# Patient Record
Sex: MALE | Race: WHITE | NOT HISPANIC OR LATINO | Employment: FULL TIME | URBAN - METROPOLITAN AREA
[De-identification: names, ages, dates, MRNs, and addresses within clinical notes are randomized per-mention and may not be internally consistent; named-entity substitution may affect disease eponyms.]

---

## 2017-01-04 VITALS
SYSTOLIC BLOOD PRESSURE: 126 MMHG | RESPIRATION RATE: 20 BRPM | DIASTOLIC BLOOD PRESSURE: 89 MMHG | BODY MASS INDEX: 47.74 KG/M2 | TEMPERATURE: 97 F | WEIGHT: 315 LBS | HEART RATE: 71 BPM | HEIGHT: 68 IN

## 2017-01-04 RX ORDER — LEVOTHYROXINE SODIUM 0.2 MG/1
200 TABLET ORAL DAILY
COMMUNITY

## 2017-06-06 ENCOUNTER — HOSPITAL ENCOUNTER (OUTPATIENT)
Dept: GENERAL RADIOLOGY | Facility: HOSPITAL | Age: 52
Discharge: HOME | End: 2017-06-06
Admitting: PHYSICIAN ASSISTANT

## 2017-06-06 ENCOUNTER — APPOINTMENT (OUTPATIENT)
Dept: LAB | Facility: HOSPITAL | Age: 52
End: 2017-06-06

## 2017-06-06 VITALS
DIASTOLIC BLOOD PRESSURE: 77 MMHG | WEIGHT: 302.5 LBS | SYSTOLIC BLOOD PRESSURE: 135 MMHG | HEIGHT: 68 IN | BODY MASS INDEX: 45.85 KG/M2 | HEART RATE: 72 BPM | RESPIRATION RATE: 18 BRPM | TEMPERATURE: 97.3 F

## 2017-06-06 DIAGNOSIS — R06.00 DYSPNEA, UNSPECIFIED TYPE: ICD-10-CM

## 2017-06-06 DIAGNOSIS — R06.00 DYSPNEA, UNSPECIFIED TYPE: Primary | ICD-10-CM

## 2017-06-06 DIAGNOSIS — R53.83 FATIGUE, UNSPECIFIED TYPE: ICD-10-CM

## 2017-06-06 LAB
ALBUMIN SERPL-MCNC: 3.9 G/DL (ref 3.2–4.8)
ALBUMIN/GLOB SERPL: 1.3 G/DL (ref 1.5–2.5)
ALP SERPL-CCNC: 90 U/L (ref 25–100)
ALT SERPL W P-5'-P-CCNC: 35 U/L (ref 7–40)
ANION GAP SERPL CALCULATED.3IONS-SCNC: 2 MMOL/L (ref 3–11)
AST SERPL-CCNC: 27 U/L (ref 0–33)
BASOPHILS # BLD AUTO: 0.04 10*3/MM3 (ref 0–0.2)
BASOPHILS NFR BLD AUTO: 0.4 % (ref 0–1)
BILIRUB SERPL-MCNC: 1 MG/DL (ref 0.3–1.2)
BUN BLD-MCNC: 21 MG/DL (ref 9–23)
BUN/CREAT SERPL: 23.3 (ref 7–25)
CALCIUM SPEC-SCNC: 9.5 MG/DL (ref 8.7–10.4)
CHLORIDE SERPL-SCNC: 105 MMOL/L (ref 99–109)
CO2 SERPL-SCNC: 32 MMOL/L (ref 20–31)
CREAT BLD-MCNC: 0.9 MG/DL (ref 0.6–1.3)
DEPRECATED RDW RBC AUTO: 41.5 FL (ref 37–54)
EOSINOPHIL # BLD AUTO: 0.15 10*3/MM3 (ref 0.1–0.3)
EOSINOPHIL NFR BLD AUTO: 1.5 % (ref 0–3)
ERYTHROCYTE [DISTWIDTH] IN BLOOD BY AUTOMATED COUNT: 12.8 % (ref 11.3–14.5)
GFR SERPL CREATININE-BSD FRML MDRD: 89 ML/MIN/1.73
GLOBULIN UR ELPH-MCNC: 3 GM/DL
GLUCOSE BLD-MCNC: 94 MG/DL (ref 70–100)
HCT VFR BLD AUTO: 48.6 % (ref 38.9–50.9)
HGB BLD-MCNC: 15.5 G/DL (ref 13.1–17.5)
IMM GRANULOCYTES # BLD: 0.03 10*3/MM3 (ref 0–0.03)
IMM GRANULOCYTES NFR BLD: 0.3 % (ref 0–0.6)
LYMPHOCYTES # BLD AUTO: 2.32 10*3/MM3 (ref 0.6–4.8)
LYMPHOCYTES NFR BLD AUTO: 23.3 % (ref 24–44)
MCH RBC QN AUTO: 28.1 PG (ref 27–31)
MCHC RBC AUTO-ENTMCNC: 31.9 G/DL (ref 32–36)
MCV RBC AUTO: 88.2 FL (ref 80–99)
MONOCYTES # BLD AUTO: 0.94 10*3/MM3 (ref 0–1)
MONOCYTES NFR BLD AUTO: 9.4 % (ref 0–12)
NEUTROPHILS # BLD AUTO: 6.48 10*3/MM3 (ref 1.5–8.3)
NEUTROPHILS NFR BLD AUTO: 65.1 % (ref 41–71)
PLATELET # BLD AUTO: 253 10*3/MM3 (ref 150–450)
PMV BLD AUTO: 10.8 FL (ref 6–12)
POTASSIUM BLD-SCNC: 4.5 MMOL/L (ref 3.5–5.5)
PROT SERPL-MCNC: 6.9 G/DL (ref 5.7–8.2)
RBC # BLD AUTO: 5.51 10*6/MM3 (ref 4.2–5.76)
SODIUM BLD-SCNC: 139 MMOL/L (ref 132–146)
WBC NRBC COR # BLD: 9.96 10*3/MM3 (ref 3.5–10.8)

## 2017-06-06 PROCEDURE — 71020 HC CHEST PA AND LATERAL: CPT

## 2017-06-06 PROCEDURE — 80053 COMPREHEN METABOLIC PANEL: CPT | Performed by: PHYSICIAN ASSISTANT

## 2017-06-06 PROCEDURE — 85025 COMPLETE CBC W/AUTO DIFF WBC: CPT | Performed by: PHYSICIAN ASSISTANT

## 2017-06-06 PROCEDURE — 36415 COLL VENOUS BLD VENIPUNCTURE: CPT

## 2017-06-12 ENCOUNTER — DOCUMENTATION (OUTPATIENT)
Dept: BARIATRICS/WEIGHT MGMT | Facility: CLINIC | Age: 52
End: 2017-06-12

## 2017-06-12 NOTE — PROGRESS NOTES
[facility] BARIATRIC SURGERY  [address]  [facility] [address] [phone]      Patient  Name:  Hao Kim.  :  1965        Chief Complaint:  weight gain; unable to maintain weight loss    History of Present Illness:  Hao Kim is a 52 y.o. male who presents today for evaluation, education and consultation regarding weight loss surgery. The patient is interested in SIPS.    Initial Intake Eval 3/2016 w/ Aster PAC --  49 y/o gentleman s/p lap LSG (34 Libyan) w/ Dr. Jurado in [place] 2011.  Uncomplicated.  Presurgery weight 410 lbs w/ BMI 62.  Initially did well.  Lost 140 lbs in the first year.  Maintained for several years, but has slowly regained in the past couple years.  Feels he has portion control but has really been unable to lose anymore.  New job is very sedentary, but walks 30 min/day.  Current weight 302 lbs w/ BMI 46.  Seeking Revision.    MD Consult 2016 w/ Dr. Dorantes -- 50 yo MO .5/48.3.  I just did his wife's 2nd stage loop DS a few weeks ago - he is well aware she was my first and only loop DS so far.  Says wife has 4-5 pasty BM's/day, losing about 2 lbs a week.  Says her stool smells foul.  Pt still very interested in proceeding with 2nd stage loop DS (SIPS).  After he left the office, we were informed that [facility] now requires it be done under an IRB.  Aware needs to take fat soluble vitamins for the rest of his life o/w risks running severe and irreversible issues such as blindness.  _______________    Past Medical History:   Diagnosis Date   • Dyspnea on exertion    • Elevated bilirubin     Chronic/ Denies prior GI eval.   • Elevated LFTs    • Fatigue    • Heartburn     UGI neg HH. EGD GDW  mild prox dil, mod narrowing angularis w sl twist, ? sm HH, 38 cm   • Hypothyroidism    • Morbid obesity    • Sleep apnea     CPAP compliant     Past Surgical History:   Procedure Laterality Date   • GASTRIC SLEEVE LAPAROSCOPIC  2011     s/p lap LSG (34 Yoruba) w/ Dr Jurado in [place]        No Known Allergies      Current Outpatient Prescriptions:   •  azithromycin (ZITHROMAX) 250 MG tablet, Take 2 tablets the first day, then 1 tablet daily for 4 days., Disp: 6 tablet, Rfl: 0  •  levothyroxine (SYNTHROID, LEVOTHROID) 200 MCG tablet, Take 200 mcg by mouth Daily., Disp: , Rfl:   •  Multiple Vitamins-Minerals (MULTIVITAMIN ADULT PO), Take  by mouth., Disp: , Rfl:   •  oxymetazoline (AFRIN) 0.05 % nasal spray, 2 sprays into each nostril 2 (Two) Times a Day., Disp: 10 mL, Rfl: 0    Social History     Social History   • Marital status:      Spouse name: N/A   • Number of children: N/A   • Years of education: N/A     Occupational History   • Not on file.     Social History Main Topics   • Smoking status: Never Smoker   • Smokeless tobacco: Never Used   • Alcohol use Yes      Comment: 1-2x per week   • Drug use: No   • Sexual activity: Not on file      Comment:      Other Topics Concern   • Not on file     Social History Narrative     Family History   Problem Relation Age of Onset   • Cancer Father    • Diabetes Father    • Stroke Father    • Hypertension Maternal Grandmother    • Heart attack Maternal Grandmother    • Obesity Maternal Grandmother          Review of Systems:    Systemic: Feeling tired (fatigue).  No fever and no chills.  Recent weight gain.  No night sweats.  Eyes: Wears glasses.  Otolaryngeal: Chronic allergies.  Cardiovascular: No hypertension, no angina, no ischemic heart disease, and no deep venous thrombosis.  Pulmonary: No asthma.  Uses CPAP / BiPAP Suffers from sleep apnea.  No pulmonary embolism.  Dyspnea during exertion.  Gastrointestinal: Reports heartburn.  No liver disease, no irritable bowel, no pancreatic disease, and no gallbladder problems.  Genitourinary: No kidney stones, no kidney failure, and no renal insuff.  Endocrine: No gout, no diabetes, and no dyslipidemia.  Hypothyroidism.  No abnormal glucose  levels.  Hematologic: No bleeding disorder and no prior blood transfusions.  Musculoskeletal: No fibromyalgia, no lupus, no autoimmune disease, and no osteoarthritis.  Hip joint pain.  Neurological: Dizziness.  No seizures and no strokes.  Psychological: No anxiety and no depression.  No bipolar disorder.  Skin: Pt reports rash under skin folds.  No history of MRSA skin infections.   All other systems reviewed/negative.      Physical Exam:  Vital Signs:      There is no height or weight on file to calculate BMI.                Physical Exam   Constitutional: He is oriented to person, place, and time. He appears well-developed and well-nourished.   HENT:   Head: Normocephalic and atraumatic.   Eyes: Conjunctivae are normal. No scleral icterus.   Neck: Neck supple. No thyromegaly present.   Cardiovascular: Normal rate and regular rhythm.    No murmur heard.  Pulmonary/Chest: Effort normal and breath sounds normal. No respiratory distress. He has no wheezes. He has no rales.   Abdominal: Soft. Bowel sounds are normal. He exhibits no distension and no mass. There is no tenderness. No hernia.   Scars:  Lap LSG   Musculoskeletal: Normal range of motion. He exhibits no edema.   Neurological: He is alert and oriented to person, place, and time. Gait normal.   Skin: Skin is warm and dry. No rash noted.   Psychiatric: He has a normal mood and affect. Judgment normal.   Vitals reviewed.         Patient Active Problem List   Diagnosis   • Sleep apnea   • Heartburn   • Fatigue   • Hypothyroidism   • Dyspnea on exertion   • Elevated LFTs   • Elevated bilirubin   • Morbid obesity       Assessment:    Hao Kim is a 52 y.o. year old male with medically complicated obesity pursuing SIPS.    Weight loss surgery is deemed medically necessary given the following obesity related comorbidities including sleep apnea with current   and There is no height or weight on file to calculate BMI.    Plan:  The consultation plan and program  requirements were reviewed with the patient.  The patient has been advised that a letter of medical support must be obtained from his primary care physician or referring provider. A psychological evaluation will be arranged.  A nutritional evaluation will be performed.  The patient was advised to start a high protein and low carbohydrate diet.  Necessary lifestyle modifications were discussed.  Instructions on how to access OTTO was given to the patient.  OTTO is an internet based educational video that explains the surgical procedure chosen and answers basic questions regarding that procedure.     Preoperative testing will include: CBC, CMP, Fasting Lipids, TSH, H.Pylori, CXR, EKG and EGD     Additional preop clearances required prior to surgery: None.      Patient understands that bariatric surgery is not cosmetic surgery but rather a tool to help make a lifelong commitment to lifestyle changes including diet, exercise, behavior modifications, and healthy habits.      SEBASTIÁN Edwards

## 2017-06-13 ENCOUNTER — CONSULT (OUTPATIENT)
Dept: BARIATRICS/WEIGHT MGMT | Facility: CLINIC | Age: 52
End: 2017-06-13

## 2017-06-13 VITALS
RESPIRATION RATE: 18 BRPM | BODY MASS INDEX: 47.74 KG/M2 | WEIGHT: 315 LBS | OXYGEN SATURATION: 99 % | HEART RATE: 88 BPM | DIASTOLIC BLOOD PRESSURE: 78 MMHG | SYSTOLIC BLOOD PRESSURE: 110 MMHG | HEIGHT: 68 IN | TEMPERATURE: 98.1 F

## 2017-06-13 DIAGNOSIS — E66.01 OBESITY, CLASS III, BMI 40-49.9 (MORBID OBESITY) (HCC): Primary | ICD-10-CM

## 2017-06-13 PROCEDURE — 99407 BEHAV CHNG SMOKING > 10 MIN: CPT | Performed by: SURGERY

## 2017-06-13 PROCEDURE — 99213 OFFICE O/P EST LOW 20 MIN: CPT | Performed by: SURGERY

## 2017-06-13 RX ORDER — SODIUM CHLORIDE, SODIUM LACTATE, POTASSIUM CHLORIDE, CALCIUM CHLORIDE 600; 310; 30; 20 MG/100ML; MG/100ML; MG/100ML; MG/100ML
150 INJECTION, SOLUTION INTRAVENOUS CONTINUOUS
Status: CANCELLED | OUTPATIENT
Start: 2017-06-13

## 2017-06-13 RX ORDER — SODIUM CHLORIDE 0.9 % (FLUSH) 0.9 %
1-10 SYRINGE (ML) INJECTION AS NEEDED
Status: CANCELLED | OUTPATIENT
Start: 2017-06-13

## 2017-06-13 RX ORDER — CHLORHEXIDINE GLUCONATE 0.12 MG/ML
15 RINSE ORAL ONCE
Status: CANCELLED | OUTPATIENT
Start: 2017-06-13

## 2017-06-13 RX ORDER — SCOLOPAMINE TRANSDERMAL SYSTEM 1 MG/1
1 PATCH, EXTENDED RELEASE TRANSDERMAL ONCE
Status: CANCELLED | OUTPATIENT
Start: 2017-06-13 | End: 2017-06-13

## 2017-06-13 RX ORDER — PANTOPRAZOLE SODIUM 40 MG/10ML
40 INJECTION, POWDER, LYOPHILIZED, FOR SOLUTION INTRAVENOUS ONCE
Status: CANCELLED | OUTPATIENT
Start: 2017-06-13 | End: 2017-06-13

## 2017-06-13 RX ORDER — ACETAMINOPHEN 10 MG/ML
1000 INJECTION, SOLUTION INTRAVENOUS ONCE
Status: CANCELLED | OUTPATIENT
Start: 2017-06-13 | End: 2017-06-13

## 2017-06-13 NOTE — PROGRESS NOTES
[facility] BARIATRIC SURGERY  [address]  [facility] [address] [phone]      Patient  Name:  Hao Kim.  :  1965        Chief Complaint:  weight gain; unable to maintain weight loss.  Preop SIPS (loop DS, SAD-I)    History of Present Illness:  Hao Kim is a 52 y.o. male who presents today for evaluation, education and consultation regarding weight loss surgery. The patient is interested in SIPS.    Initial Intake Eval 3/2016 w/ Aster PAC --  51 y/o gentleman s/p lap LSG (34 Latvian) w/ Dr. Jurado in [place] 2011.  Uncomplicated.  Presurgery weight 410 lbs w/ BMI 62.  Initially did well.  Lost 140 lbs in the first year.  Maintained for several years, but has slowly regained in the past couple years.  Feels he has portion control but has really been unable to lose anymore.  New job is very sedentary, but walks 30 min/day.  Current weight 302 lbs w/ BMI 46.  Seeking Revision.    MD Consult 2016 w/ Dr. Dorantes -- 52 yo MO .5/48.3.  I just did his wife's 2nd stage loop DS a few weeks ago - he is well aware she was my first and only loop DS so far.  Says wife has 4-5 pasty BM's/day, losing about 2 lbs a week.  Says her stool smells foul.  Pt still very interested in proceeding with 2nd stage loop DS (SIPS).  After he left the office, we were informed that [facility] requires it be done under an IR[facility].  Aware needs to take fat soluble vitamins for the rest of his life o/w risks running severe and irreversible issues such as blindness.  _______________  Returns for final visit prior to investigational SIPS.  Pt aware being done under IRB, went over consent again in detail, wishes to proceed.  Says his wife is doing well, has lost about 40 lbs since November, has several foul stools per day - the patient is aware and is not deterred, very much wishes to proceed.    Past Medical History:   Diagnosis Date   • Dyspnea on exertion    • Elevated bilirubin      Chronic/ Denies prior GI eval.   • Elevated LFTs    • Fatigue    • Heartburn     UGI neg HH. EGD GDW 4/16 mild prox dil, mod narrowing angularis w sl twist, ? sm HH, 38 cm.  Says sx's aren't kalyn, controlled w Zantac. No dysphagia   • HLD (hyperlipidemia)     Recently dx, mildly elevated, says provider rec statin, he wants to wait   • Hypothyroidism     recently incr dose synthroid to 200 ug   • Morbid obesity    • Sleep apnea     CPAP compliant     Past Surgical History:   Procedure Laterality Date   • GASTRIC SLEEVE LAPAROSCOPIC  01/2011    s/p lap LSG (34 Danish) w/ Dr Jurado in [place]        No Known Allergies      Current Outpatient Prescriptions:   •  levothyroxine (SYNTHROID, LEVOTHROID) 200 MCG tablet, Take 200 mcg by mouth Daily., Disp: , Rfl:   •  Multiple Vitamins-Minerals (MULTIVITAMIN ADULT PO), Take  by mouth., Disp: , Rfl:   •  oxymetazoline (AFRIN) 0.05 % nasal spray, 2 sprays into each nostril 2 (Two) Times a Day., Disp: 10 mL, Rfl: 0  •  azithromycin (ZITHROMAX) 250 MG tablet, Take 2 tablets the first day, then 1 tablet daily for 4 days., Disp: 6 tablet, Rfl: 0    Social History     Social History   • Marital status:      Spouse name: N/A   • Number of children: N/A   • Years of education: N/A     Occupational History   • Not on file.     Social History Main Topics   • Smoking status: Never Smoker   • Smokeless tobacco: Never Used   • Alcohol use Yes      Comment: 1-2x per week   • Drug use: No   • Sexual activity: Not on file      Comment:      Other Topics Concern   • Not on file     Social History Narrative     Family History   Problem Relation Age of Onset   • Cancer Father    • Diabetes Father    • Stroke Father    • Hypertension Maternal Grandmother    • Heart attack Maternal Grandmother    • Obesity Maternal Grandmother          Review of Systems:    Systemic: Feeling tired (fatigue).  No fever and no chills.  Recent weight gain.  No night sweats.  Eyes: Wears  glasses.  Otolaryngeal: Chronic allergies.  Cardiovascular: No hypertension, no angina, no ischemic heart disease, and no deep venous thrombosis.  Pulmonary: No asthma.  Uses CPAP / BiPAP Suffers from sleep apnea.  No pulmonary embolism.  Dyspnea during exertion.  Gastrointestinal: Reports heartburn.  No liver disease, no irritable bowel, no pancreatic disease, and no gallbladder problems.  Genitourinary: No kidney stones, no kidney failure, and no renal insuff.  Endocrine: No gout, no diabetes, and no dyslipidemia.  Hypothyroidism.  No abnormal glucose levels.  Hematologic: No bleeding disorder and no prior blood transfusions.  Musculoskeletal: No fibromyalgia, no lupus, no autoimmune disease, and no osteoarthritis.  Hip joint pain.  Neurological: Dizziness.  No seizures and no strokes.  Psychological: No anxiety and no depression.  No bipolar disorder.  Skin: Pt reports rash under skin folds.  No history of MRSA skin infections.   All other systems reviewed/negative.      Physical Exam:  Vital Signs:  Weight: (!) 321 lb 8 oz (146 kg)   Body mass index is 48.88 kg/(m^2).  Temp: 98.1 °F (36.7 °C)   Heart Rate: 88   BP: 110/78     Physical Exam   Constitutional: He is oriented to person, place, and time. He appears well-developed and well-nourished.   HENT:   Head: Normocephalic and atraumatic.   Eyes: Conjunctivae are normal. No scleral icterus.   Neck: Neck supple. Carotid bruit is not present. No thyromegaly present.   Cardiovascular: Normal rate and regular rhythm.    No murmur heard.  Pulmonary/Chest: Effort normal and breath sounds normal. No respiratory distress. He has no wheezes. He has no rales.   Abdominal: Soft. Bowel sounds are normal. He exhibits no distension and no mass. There is no hepatosplenomegaly. There is no tenderness. No hernia.   Scars:  Lap LSG   Musculoskeletal: Normal range of motion. He exhibits no edema.   Neurological: He is alert and oriented to person, place, and time. Gait normal.  "  Skin: Skin is warm and dry. No rash noted.   Psychiatric: He has a normal mood and affect. Judgment normal.   Vitals reviewed.         Patient Active Problem List   Diagnosis   • Sleep apnea   • Heartburn   • Fatigue   • Hypothyroidism   • Dyspnea on exertion   • Elevated LFTs   • Elevated bilirubin   • Morbid obesity     Psych 3/16 Swati Hagen PhD  Sha neg  Assessment:    Hao Kim is a 52 y.o. year old male with medically complicated obesity pursuing SIPS.    Weight loss surgery is deemed medically necessary given the following obesity related comorbidities including sleep apnea with current Weight: (!) 321 lb 8 oz (146 kg) and Body mass index is 48.88 kg/(m^2).       Patient is aware that surgery is a tool, and that weight loss is not guaranteed but only seen in the context of appropriate use, follow up and exercise.    Complications  of laparoscopic SIPS were discussed. The patient is well aware of the potential complications of surgery that include but not limited to bleeding, infections, deep venous thrombosis, pulmonary embolism, pulmonary complications such as pneumonia, cardiac events, hernias, small bowel obstruction, damage to the spleen or other organs, bowel injury, disfiguring scars, failure to lose weight, need for additional surgery, conversion to an open procedure, and death. Patient is also aware of complications which apply in this particular procedure that can include but are not limited to a \"leak\" at the anastomosis.    Greater than 10 minutes was spent with the patient discussing avoiding all tobacco products and second hand smoke at least 2 weeks pre-operatively and 6 weeks post-operatively to minimize the risk of sleeve leak     Discussed the risks, benefits and alternative therapies at great length as outlined in our extensive consent forms, consent videos, and educational teaching process under the direction of the center's .    A copy of the patient's signed " informed consent is on file.      Plan:  SIPS (loop DS) as per study protocol.  Given sx's and poss HH on EGD, pt agreeable to poss lap HHR at time OR if HH present.    Patient understands that bariatric surgery is not cosmetic surgery but rather a tool to help make a lifelong commitment to lifestyle changes including diet, exercise, behavior modifications, and healthy habits.      Rob Dorantes MD

## 2017-06-18 ENCOUNTER — APPOINTMENT (OUTPATIENT)
Dept: PREADMISSION TESTING | Facility: HOSPITAL | Age: 52
End: 2017-06-18

## 2017-06-18 LAB
DEPRECATED RDW RBC AUTO: 40.4 FL (ref 37–54)
ERYTHROCYTE [DISTWIDTH] IN BLOOD BY AUTOMATED COUNT: 12.7 % (ref 11.3–14.5)
HBA1C MFR BLD: 5.6 % (ref 4.8–5.6)
HCT VFR BLD AUTO: 48.6 % (ref 38.9–50.9)
HGB BLD-MCNC: 16.2 G/DL (ref 13.1–17.5)
MCH RBC QN AUTO: 28.9 PG (ref 27–31)
MCHC RBC AUTO-ENTMCNC: 33.3 G/DL (ref 32–36)
MCV RBC AUTO: 86.6 FL (ref 80–99)
PLATELET # BLD AUTO: 211 10*3/MM3 (ref 150–450)
PMV BLD AUTO: 10 FL (ref 6–12)
RBC # BLD AUTO: 5.61 10*6/MM3 (ref 4.2–5.76)
WBC NRBC COR # BLD: 7.13 10*3/MM3 (ref 3.5–10.8)

## 2017-06-18 PROCEDURE — 93010 ELECTROCARDIOGRAM REPORT: CPT | Performed by: INTERNAL MEDICINE

## 2017-06-18 PROCEDURE — 36415 COLL VENOUS BLD VENIPUNCTURE: CPT

## 2017-06-18 PROCEDURE — 83036 HEMOGLOBIN GLYCOSYLATED A1C: CPT | Performed by: ANESTHESIOLOGY

## 2017-06-18 PROCEDURE — 93005 ELECTROCARDIOGRAM TRACING: CPT

## 2017-06-18 PROCEDURE — 85027 COMPLETE CBC AUTOMATED: CPT | Performed by: ANESTHESIOLOGY

## 2017-06-27 ENCOUNTER — ANESTHESIA EVENT (OUTPATIENT)
Dept: PERIOP | Facility: HOSPITAL | Age: 52
End: 2017-06-27

## 2017-06-28 ENCOUNTER — ANESTHESIA (OUTPATIENT)
Dept: PERIOP | Facility: HOSPITAL | Age: 52
End: 2017-06-28

## 2017-06-28 ENCOUNTER — HOSPITAL ENCOUNTER (INPATIENT)
Facility: HOSPITAL | Age: 52
LOS: 1 days | Discharge: HOME | End: 2017-06-29
Attending: SURGERY | Admitting: SURGERY

## 2017-06-28 DIAGNOSIS — E66.01 OBESITY, CLASS III, BMI 40-49.9 (MORBID OBESITY) (HCC): ICD-10-CM

## 2017-06-28 PROCEDURE — 25010000002 PROPOFOL 1000 MG/ML EMULSION: Performed by: NURSE ANESTHETIST, CERTIFIED REGISTERED

## 2017-06-28 PROCEDURE — 25010000002 BUPRENORPHINE PER 0.1 MG: Performed by: NURSE ANESTHETIST, CERTIFIED REGISTERED

## 2017-06-28 PROCEDURE — 25010000002 MIDAZOLAM PER 1 MG: Performed by: NURSE ANESTHETIST, CERTIFIED REGISTERED

## 2017-06-28 PROCEDURE — 25010000002 DEXAMETHASONE SODIUM PHOSPHATE 10 MG/ML SOLUTION 1 ML VIAL: Performed by: NURSE ANESTHETIST, CERTIFIED REGISTERED

## 2017-06-28 PROCEDURE — 94799 UNLISTED PULMONARY SVC/PX: CPT

## 2017-06-28 PROCEDURE — 25010000002 DEXAMETHASONE PER 1 MG: Performed by: NURSE ANESTHETIST, CERTIFIED REGISTERED

## 2017-06-28 PROCEDURE — 25010000002 ENOXAPARIN PER 10 MG: Performed by: SURGERY

## 2017-06-28 PROCEDURE — 25010000003 CEFAZOLIN IN DEXTROSE 2-4 GM/100ML-% SOLUTION: Performed by: SURGERY

## 2017-06-28 PROCEDURE — 25010000002 FENTANYL CITRATE (PF) 100 MCG/2ML SOLUTION: Performed by: NURSE ANESTHETIST, CERTIFIED REGISTERED

## 2017-06-28 PROCEDURE — 25010000002 ONDANSETRON PER 1 MG: Performed by: NURSE ANESTHETIST, CERTIFIED REGISTERED

## 2017-06-28 PROCEDURE — 44238 UNLISTED LAPS PX INTESTINE: CPT | Performed by: SURGERY

## 2017-06-28 PROCEDURE — 25010000002 PROPOFOL 10 MG/ML EMULSION: Performed by: NURSE ANESTHETIST, CERTIFIED REGISTERED

## 2017-06-28 PROCEDURE — 0D194ZA BYPASS DUODENUM TO JEJUNUM, PERCUTANEOUS ENDOSCOPIC APPROACH: ICD-10-PCS | Performed by: SURGERY

## 2017-06-28 PROCEDURE — 25010000002 NEOSTIGMINE 10 MG/10ML SOLUTION: Performed by: NURSE ANESTHETIST, CERTIFIED REGISTERED

## 2017-06-28 DEVICE — PERI-STRIPS DRY WITH VERITAS COLLAGEN MATRIX (PSD-V) IS PREPARED FROM DEHYDRATED BOVINE PERICARDIUM PROCURED FROM CATTLE UNDER 30 MONTHS OF AGE IN THE UNITED STATES. ONE (1) TUBE OF PSD GEL (GEL) IS PROVIDED FOR EVERY TWO (2) POUCHES OF PSD-V. THE GEL IS USED TO CREATE A TEMPORARY BOND BETWEEN THE PSD-V BUTTRESS AND THE SURGICAL STAPLER JAWS UNTIL THE STAPLER IS POSITIONED AND FIRED.
Type: IMPLANTABLE DEVICE | Site: STOMACH | Status: FUNCTIONAL
Brand: PERI-STRIPS DRY WITH VERITAS COLLAGEN MATRIX

## 2017-06-28 RX ORDER — HYDROCODONE BITARTRATE AND ACETAMINOPHEN 7.5; 325 MG/1; MG/1
1 TABLET ORAL EVERY 4 HOURS PRN
Status: DISCONTINUED | OUTPATIENT
Start: 2017-06-28 | End: 2017-06-29 | Stop reason: HOSPADM

## 2017-06-28 RX ORDER — FAMOTIDINE 20 MG/1
20 TABLET, FILM COATED ORAL
Status: CANCELLED | OUTPATIENT
Start: 2017-06-28

## 2017-06-28 RX ORDER — DIPHENHYDRAMINE HYDROCHLORIDE 50 MG/ML
25 INJECTION INTRAMUSCULAR; INTRAVENOUS EVERY 4 HOURS PRN
Status: DISCONTINUED | OUTPATIENT
Start: 2017-06-28 | End: 2017-06-29 | Stop reason: HOSPADM

## 2017-06-28 RX ORDER — PROPOFOL 10 MG/ML
VIAL (ML) INTRAVENOUS AS NEEDED
Status: DISCONTINUED | OUTPATIENT
Start: 2017-06-28 | End: 2017-06-28 | Stop reason: SURG

## 2017-06-28 RX ORDER — PROMETHAZINE HYDROCHLORIDE 25 MG/ML
6.25 INJECTION, SOLUTION INTRAMUSCULAR; INTRAVENOUS ONCE AS NEEDED
Status: DISCONTINUED | OUTPATIENT
Start: 2017-06-28 | End: 2017-06-28 | Stop reason: HOSPADM

## 2017-06-28 RX ORDER — HYDROMORPHONE HYDROCHLORIDE 2 MG/1
2 TABLET ORAL EVERY 4 HOURS PRN
Status: DISCONTINUED | OUTPATIENT
Start: 2017-06-28 | End: 2017-06-29 | Stop reason: HOSPADM

## 2017-06-28 RX ORDER — ONDANSETRON 4 MG/1
4 TABLET, FILM COATED ORAL EVERY 6 HOURS PRN
Status: DISCONTINUED | OUTPATIENT
Start: 2017-06-28 | End: 2017-06-29 | Stop reason: HOSPADM

## 2017-06-28 RX ORDER — PROMETHAZINE HYDROCHLORIDE 25 MG/1
25 SUPPOSITORY RECTAL ONCE AS NEEDED
Status: DISCONTINUED | OUTPATIENT
Start: 2017-06-28 | End: 2017-06-28 | Stop reason: HOSPADM

## 2017-06-28 RX ORDER — CEFAZOLIN SODIUM 2 G/100ML
2 INJECTION, SOLUTION INTRAVENOUS EVERY 8 HOURS
Status: COMPLETED | OUTPATIENT
Start: 2017-06-28 | End: 2017-06-29

## 2017-06-28 RX ORDER — GLYCOPYRROLATE 0.2 MG/ML
INJECTION INTRAMUSCULAR; INTRAVENOUS AS NEEDED
Status: DISCONTINUED | OUTPATIENT
Start: 2017-06-28 | End: 2017-06-28 | Stop reason: SURG

## 2017-06-28 RX ORDER — SCOLOPAMINE TRANSDERMAL SYSTEM 1 MG/1
1 PATCH, EXTENDED RELEASE TRANSDERMAL ONCE
Status: DISCONTINUED | OUTPATIENT
Start: 2017-06-28 | End: 2017-06-28

## 2017-06-28 RX ORDER — CHLORHEXIDINE GLUCONATE 0.12 MG/ML
15 RINSE ORAL ONCE
Status: COMPLETED | OUTPATIENT
Start: 2017-06-28 | End: 2017-06-28

## 2017-06-28 RX ORDER — MORPHINE SULFATE 2 MG/ML
6 INJECTION, SOLUTION INTRAMUSCULAR; INTRAVENOUS
Status: DISCONTINUED | OUTPATIENT
Start: 2017-06-28 | End: 2017-06-29 | Stop reason: HOSPADM

## 2017-06-28 RX ORDER — PROMETHAZINE HYDROCHLORIDE 25 MG/1
25 TABLET ORAL ONCE AS NEEDED
Status: DISCONTINUED | OUTPATIENT
Start: 2017-06-28 | End: 2017-06-28 | Stop reason: HOSPADM

## 2017-06-28 RX ORDER — LIDOCAINE HYDROCHLORIDE 10 MG/ML
0.5 INJECTION, SOLUTION EPIDURAL; INFILTRATION; INTRACAUDAL; PERINEURAL ONCE AS NEEDED
Status: DISCONTINUED | OUTPATIENT
Start: 2017-06-28 | End: 2017-06-28 | Stop reason: HOSPADM

## 2017-06-28 RX ORDER — SODIUM CHLORIDE, SODIUM LACTATE, POTASSIUM CHLORIDE, CALCIUM CHLORIDE 600; 310; 30; 20 MG/100ML; MG/100ML; MG/100ML; MG/100ML
150 INJECTION, SOLUTION INTRAVENOUS CONTINUOUS
Status: DISCONTINUED | OUTPATIENT
Start: 2017-06-28 | End: 2017-06-28 | Stop reason: HOSPADM

## 2017-06-28 RX ORDER — MIDAZOLAM HYDROCHLORIDE 1 MG/ML
INJECTION INTRAMUSCULAR; INTRAVENOUS AS NEEDED
Status: DISCONTINUED | OUTPATIENT
Start: 2017-06-28 | End: 2017-06-28 | Stop reason: SURG

## 2017-06-28 RX ORDER — NALOXONE HCL 0.4 MG/ML
0.4 VIAL (ML) INJECTION
Status: DISCONTINUED | OUTPATIENT
Start: 2017-06-28 | End: 2017-06-29 | Stop reason: HOSPADM

## 2017-06-28 RX ORDER — CYANOCOBALAMIN 1000 UG/ML
1000 INJECTION, SOLUTION INTRAMUSCULAR; SUBCUTANEOUS ONCE
Status: COMPLETED | OUTPATIENT
Start: 2017-06-29 | End: 2017-06-29

## 2017-06-28 RX ORDER — ACETAMINOPHEN 10 MG/ML
1000 INJECTION, SOLUTION INTRAVENOUS EVERY 6 HOURS
Status: COMPLETED | OUTPATIENT
Start: 2017-06-28 | End: 2017-06-29

## 2017-06-28 RX ORDER — MAGNESIUM HYDROXIDE 1200 MG/15ML
LIQUID ORAL AS NEEDED
Status: DISCONTINUED | OUTPATIENT
Start: 2017-06-28 | End: 2017-06-28 | Stop reason: HOSPADM

## 2017-06-28 RX ORDER — SODIUM CHLORIDE, SODIUM LACTATE, POTASSIUM CHLORIDE, CALCIUM CHLORIDE 600; 310; 30; 20 MG/100ML; MG/100ML; MG/100ML; MG/100ML
150 INJECTION, SOLUTION INTRAVENOUS CONTINUOUS
Status: DISCONTINUED | OUTPATIENT
Start: 2017-06-28 | End: 2017-06-29 | Stop reason: HOSPADM

## 2017-06-28 RX ORDER — MORPHINE SULFATE 2 MG/ML
4 INJECTION, SOLUTION INTRAMUSCULAR; INTRAVENOUS
Status: DISCONTINUED | OUTPATIENT
Start: 2017-06-28 | End: 2017-06-29 | Stop reason: HOSPADM

## 2017-06-28 RX ORDER — LABETALOL HYDROCHLORIDE 5 MG/ML
10 INJECTION, SOLUTION INTRAVENOUS
Status: DISCONTINUED | OUTPATIENT
Start: 2017-06-28 | End: 2017-06-29 | Stop reason: HOSPADM

## 2017-06-28 RX ORDER — LORAZEPAM 1 MG/1
1 TABLET ORAL EVERY 12 HOURS PRN
Status: DISCONTINUED | OUTPATIENT
Start: 2017-06-28 | End: 2017-06-29 | Stop reason: HOSPADM

## 2017-06-28 RX ORDER — DEXAMETHASONE SODIUM PHOSPHATE 4 MG/ML
INJECTION, SOLUTION INTRA-ARTICULAR; INTRALESIONAL; INTRAMUSCULAR; INTRAVENOUS; SOFT TISSUE AS NEEDED
Status: DISCONTINUED | OUTPATIENT
Start: 2017-06-28 | End: 2017-06-28 | Stop reason: SURG

## 2017-06-28 RX ORDER — LIDOCAINE HYDROCHLORIDE 10 MG/ML
INJECTION, SOLUTION INFILTRATION; PERINEURAL AS NEEDED
Status: DISCONTINUED | OUTPATIENT
Start: 2017-06-28 | End: 2017-06-28

## 2017-06-28 RX ORDER — PANTOPRAZOLE SODIUM 40 MG/10ML
40 INJECTION, POWDER, LYOPHILIZED, FOR SOLUTION INTRAVENOUS
Status: DISCONTINUED | OUTPATIENT
Start: 2017-06-29 | End: 2017-06-29 | Stop reason: HOSPADM

## 2017-06-28 RX ORDER — SODIUM CHLORIDE 0.9 % (FLUSH) 0.9 %
1-10 SYRINGE (ML) INJECTION AS NEEDED
Status: DISCONTINUED | OUTPATIENT
Start: 2017-06-28 | End: 2017-06-28 | Stop reason: HOSPADM

## 2017-06-28 RX ORDER — HYDROMORPHONE HYDROCHLORIDE 1 MG/ML
0.5 INJECTION, SOLUTION INTRAMUSCULAR; INTRAVENOUS; SUBCUTANEOUS
Status: DISCONTINUED | OUTPATIENT
Start: 2017-06-28 | End: 2017-06-28 | Stop reason: HOSPADM

## 2017-06-28 RX ORDER — LORAZEPAM 2 MG/ML
0.5 INJECTION INTRAMUSCULAR EVERY 12 HOURS PRN
Status: DISCONTINUED | OUTPATIENT
Start: 2017-06-28 | End: 2017-06-29 | Stop reason: HOSPADM

## 2017-06-28 RX ORDER — FENTANYL CITRATE 50 UG/ML
INJECTION, SOLUTION INTRAMUSCULAR; INTRAVENOUS AS NEEDED
Status: DISCONTINUED | OUTPATIENT
Start: 2017-06-28 | End: 2017-06-28 | Stop reason: SURG

## 2017-06-28 RX ORDER — CLONIDINE HYDROCHLORIDE 0.1 MG/1
0.1 TABLET ORAL EVERY 6 HOURS PRN
Status: DISCONTINUED | OUTPATIENT
Start: 2017-06-28 | End: 2017-06-29 | Stop reason: HOSPADM

## 2017-06-28 RX ORDER — LIDOCAINE HYDROCHLORIDE 10 MG/ML
INJECTION, SOLUTION INFILTRATION; PERINEURAL AS NEEDED
Status: DISCONTINUED | OUTPATIENT
Start: 2017-06-28 | End: 2017-06-28 | Stop reason: SURG

## 2017-06-28 RX ORDER — BUPIVACAINE HYDROCHLORIDE AND EPINEPHRINE 2.5; 5 MG/ML; UG/ML
INJECTION, SOLUTION EPIDURAL; INFILTRATION; INTRACAUDAL; PERINEURAL AS NEEDED
Status: DISCONTINUED | OUTPATIENT
Start: 2017-06-28 | End: 2017-06-28 | Stop reason: HOSPADM

## 2017-06-28 RX ORDER — ONDANSETRON 2 MG/ML
4 INJECTION INTRAMUSCULAR; INTRAVENOUS EVERY 6 HOURS PRN
Status: DISCONTINUED | OUTPATIENT
Start: 2017-06-28 | End: 2017-06-29 | Stop reason: HOSPADM

## 2017-06-28 RX ORDER — ONDANSETRON 2 MG/ML
INJECTION INTRAMUSCULAR; INTRAVENOUS AS NEEDED
Status: DISCONTINUED | OUTPATIENT
Start: 2017-06-28 | End: 2017-06-28 | Stop reason: SURG

## 2017-06-28 RX ORDER — ROCURONIUM BROMIDE 10 MG/ML
INJECTION, SOLUTION INTRAVENOUS AS NEEDED
Status: DISCONTINUED | OUTPATIENT
Start: 2017-06-28 | End: 2017-06-28 | Stop reason: SURG

## 2017-06-28 RX ORDER — LEVOTHYROXINE SODIUM 0.2 MG/1
200 TABLET ORAL DAILY
Status: DISCONTINUED | OUTPATIENT
Start: 2017-06-29 | End: 2017-06-29 | Stop reason: HOSPADM

## 2017-06-28 RX ORDER — CEFAZOLIN SODIUM 2 G/100ML
2 INJECTION, SOLUTION INTRAVENOUS ONCE
Status: COMPLETED | OUTPATIENT
Start: 2017-06-28 | End: 2017-06-28

## 2017-06-28 RX ORDER — RANITIDINE HCL 75 MG
75 TABLET ORAL 2 TIMES DAILY
COMMUNITY

## 2017-06-28 RX ORDER — FENTANYL CITRATE 50 UG/ML
50 INJECTION, SOLUTION INTRAMUSCULAR; INTRAVENOUS
Status: DISCONTINUED | OUTPATIENT
Start: 2017-06-28 | End: 2017-06-28 | Stop reason: HOSPADM

## 2017-06-28 RX ORDER — ACETAMINOPHEN 10 MG/ML
1000 INJECTION, SOLUTION INTRAVENOUS ONCE
Status: COMPLETED | OUTPATIENT
Start: 2017-06-28 | End: 2017-06-28

## 2017-06-28 RX ORDER — SIMETHICONE 80 MG
80 TABLET,CHEWABLE ORAL 4 TIMES DAILY PRN
Status: DISCONTINUED | OUTPATIENT
Start: 2017-06-28 | End: 2017-06-29 | Stop reason: HOSPADM

## 2017-06-28 RX ORDER — SODIUM CHLORIDE, SODIUM LACTATE, POTASSIUM CHLORIDE, CALCIUM CHLORIDE 600; 310; 30; 20 MG/100ML; MG/100ML; MG/100ML; MG/100ML
9 INJECTION, SOLUTION INTRAVENOUS CONTINUOUS PRN
Status: CANCELLED | OUTPATIENT
Start: 2017-06-28

## 2017-06-28 RX ORDER — SODIUM CHLORIDE 9 MG/ML
INJECTION, SOLUTION INTRAVENOUS AS NEEDED
Status: DISCONTINUED | OUTPATIENT
Start: 2017-06-28 | End: 2017-06-28 | Stop reason: HOSPADM

## 2017-06-28 RX ORDER — FAMOTIDINE 10 MG/ML
20 INJECTION, SOLUTION INTRAVENOUS
Status: CANCELLED | OUTPATIENT
Start: 2017-06-28

## 2017-06-28 RX ORDER — NEOSTIGMINE METHYLSULFATE 1 MG/ML
INJECTION, SOLUTION INTRAVENOUS AS NEEDED
Status: DISCONTINUED | OUTPATIENT
Start: 2017-06-28 | End: 2017-06-28 | Stop reason: SURG

## 2017-06-28 RX ORDER — SODIUM CHLORIDE AND POTASSIUM CHLORIDE 150; 450 MG/100ML; MG/100ML
125 INJECTION, SOLUTION INTRAVENOUS CONTINUOUS
Status: DISCONTINUED | OUTPATIENT
Start: 2017-06-29 | End: 2017-06-29 | Stop reason: HOSPADM

## 2017-06-28 RX ORDER — PANTOPRAZOLE SODIUM 40 MG/10ML
40 INJECTION, POWDER, LYOPHILIZED, FOR SOLUTION INTRAVENOUS ONCE
Status: COMPLETED | OUTPATIENT
Start: 2017-06-28 | End: 2017-06-28

## 2017-06-28 RX ADMIN — FENTANYL CITRATE 50 MCG: 50 INJECTION, SOLUTION INTRAMUSCULAR; INTRAVENOUS at 09:43

## 2017-06-28 RX ADMIN — NEOSTIGMINE METHYLSULFATE 2.5 MG: 1 INJECTION, SOLUTION INTRAVENOUS at 09:38

## 2017-06-28 RX ADMIN — ACETAMINOPHEN 1000 MG: 10 INJECTION, SOLUTION INTRAVENOUS at 18:03

## 2017-06-28 RX ADMIN — PROPOFOL 200 MG: 10 INJECTION, EMULSION INTRAVENOUS at 07:59

## 2017-06-28 RX ADMIN — ONDANSETRON 4 MG: 2 INJECTION INTRAMUSCULAR; INTRAVENOUS at 09:25

## 2017-06-28 RX ADMIN — ROCURONIUM BROMIDE 10 MG: 10 INJECTION INTRAVENOUS at 08:42

## 2017-06-28 RX ADMIN — PANTOPRAZOLE SODIUM 40 MG: 40 INJECTION, POWDER, FOR SOLUTION INTRAVENOUS at 06:38

## 2017-06-28 RX ADMIN — FENTANYL CITRATE 50 MCG: 50 INJECTION, SOLUTION INTRAMUSCULAR; INTRAVENOUS at 07:59

## 2017-06-28 RX ADMIN — PROPOFOL 25 MCG/KG/MIN: 10 INJECTION, EMULSION INTRAVENOUS at 08:06

## 2017-06-28 RX ADMIN — SODIUM CHLORIDE, POTASSIUM CHLORIDE, SODIUM LACTATE AND CALCIUM CHLORIDE 1000 ML: 600; 310; 30; 20 INJECTION, SOLUTION INTRAVENOUS at 06:39

## 2017-06-28 RX ADMIN — SCOPALAMINE 1 PATCH: 1 PATCH, EXTENDED RELEASE TRANSDERMAL at 06:38

## 2017-06-28 RX ADMIN — GLYCOPYRROLATE 0.2 MG: 0.2 INJECTION, SOLUTION INTRAMUSCULAR; INTRAVENOUS at 08:29

## 2017-06-28 RX ADMIN — CHLORHEXIDINE GLUCONATE 15 ML: 1.2 RINSE ORAL at 06:41

## 2017-06-28 RX ADMIN — GLYCOPYRROLATE 0.4 MG: 0.2 INJECTION, SOLUTION INTRAMUSCULAR; INTRAVENOUS at 09:38

## 2017-06-28 RX ADMIN — DEXAMETHASONE SODIUM PHOSPHATE 8 MG: 4 INJECTION, SOLUTION INTRAMUSCULAR; INTRAVENOUS at 08:08

## 2017-06-28 RX ADMIN — DEXAMETHASONE SODIUM PHOSPHATE 60 ML: 10 INJECTION, SOLUTION INTRAMUSCULAR; INTRAVENOUS at 08:00

## 2017-06-28 RX ADMIN — ACETAMINOPHEN 1000 MG: 10 INJECTION, SOLUTION INTRAVENOUS at 09:25

## 2017-06-28 RX ADMIN — CEFAZOLIN SODIUM 2 G: 2 INJECTION, SOLUTION INTRAVENOUS at 07:52

## 2017-06-28 RX ADMIN — SODIUM CHLORIDE, POTASSIUM CHLORIDE, SODIUM LACTATE AND CALCIUM CHLORIDE: 600; 310; 30; 20 INJECTION, SOLUTION INTRAVENOUS at 07:51

## 2017-06-28 RX ADMIN — MIDAZOLAM HYDROCHLORIDE 2 MG: 1 INJECTION, SOLUTION INTRAMUSCULAR; INTRAVENOUS at 07:52

## 2017-06-28 RX ADMIN — ROCURONIUM BROMIDE 40 MG: 10 INJECTION INTRAVENOUS at 07:59

## 2017-06-28 RX ADMIN — LIDOCAINE HYDROCHLORIDE 50 MG: 10 INJECTION, SOLUTION INFILTRATION; PERINEURAL at 07:59

## 2017-06-28 RX ADMIN — CEFAZOLIN SODIUM 2 G: 2 INJECTION, SOLUTION INTRAVENOUS at 16:36

## 2017-06-28 NOTE — ANESTHESIA POSTPROCEDURE EVALUATION
Patient: Hao Kim    Procedure Summary     Date Anesthesia Start Anesthesia Stop Room / Location    06/28/17 0752   [facility] 02 /  [facility]       Procedure Diagnosis Surgeon Provider    Laparoscopic Stomach Intestinal Pylorus-Sparing Surgery (SIPS)  (N/A Abdomen) Obesity, Class III, BMI 40-49.9 (morbid obesity)  (Obesity, Class III, BMI 40-49.9 (morbid obesity) [E66.01]) MD Chaz Rodriguez MD          Anesthesia Type: general  Last vitals  BP      Temp      Pulse     Resp      SpO2        Post Anesthesia Care and Evaluation    Patient location during evaluation: PACU  Patient participation: complete - patient participated  Level of consciousness: awake and alert  Pain score: 0  Pain management: adequate  Airway patency: patent  Anesthetic complications: No anesthetic complications  PONV Status: none  Cardiovascular status: hemodynamically stable and acceptable  Respiratory status: nonlabored ventilation, acceptable and nasal cannula  Hydration status: acceptable

## 2017-06-28 NOTE — ANESTHESIA PROCEDURE NOTES
Peripheral Block    Patient location during procedure: pre-op  Reason for block: at surgeon's request and post-op pain management  Preanesthetic Checklist  Completed: patient identified, site marked, surgical consent, pre-op evaluation, timeout performed, IV checked, risks and benefits discussed and monitors and equipment checked  Peripheral Block Prep:  Sterile barriers:cap, gloves, sterile barriers and mask  Prep: ChloraPrep  Patient monitoring: blood pressure monitoring, continuous pulse oximetry and EKG  Peripheral Procedure  Sedation:yes (GA)  Guidance:ultrasound guided  Images:still images obtained    Laterality:Bilateral  Block Type:TAP  Injection Technique:single-shot  Needle Type:short-bevel  Needle Gauge:20 G    Medications  Comment:Block Injection:  LA dose divided between Right and Left block       Adjuncts:  Decadron 4mg PSF, Buprenex 0.3mg (Per total volume of LA)  Local Injected:bupivacaine 0.25% Local Amount Injected:60mL  Post Assessment  Injection Assessment: negative aspiration for heme, incremental injection and no paresthesia on injection  Patient Tolerance:comfortable throughout block  Complications:no  Additional Notes  The pt was placed in the Supine Position and was  anesthetized with:       General Anesthesia     Under Ultrasound guidance, a BBraun 4inch 360 degree needle was advanced with Normal Saline hydro dissection of tissue.  The Internal Oblique and Transversus Abdominus muscles where visualized.  At or before the aponeurosis of Internal Oblique, local anesthetic spread was visualized in the Transversus Abdominus Plane. Injection was made incrementally with aspiration every 5 mls.  There was no  intravascular injection,  injection pressure was normal, there was no neural injection, and the procedure was completed without difficulty.  Thank You.

## 2017-06-28 NOTE — ANESTHESIA PREPROCEDURE EVALUATION
Anesthesia Evaluation     Patient summary reviewed and Nursing notes reviewed   no history of anesthetic complications:  NPO Solid Status: > 8 hours  NPO Liquid Status: > 2 hours     Airway   Mallampati: I  TM distance: >3 FB  Neck ROM: full  no difficulty expected  Dental - normal exam     Pulmonary - normal exam    breath sounds clear to auscultation  (+) shortness of breath, sleep apnea on CPAP,   Cardiovascular - normal exam  Exercise tolerance: good (4-7 METS)    Rhythm: regular  Rate: normal    (+) hyperlipidemia      Neuro/Psych- negative ROS  GI/Hepatic/Renal/Endo    (+) morbid obesity, hypothyroidism,     Musculoskeletal     Abdominal   (+) obese,     Abdomen: soft.   Substance History      OB/GYN          Other   (+) arthritis                                     Anesthesia Plan    ASA 3     general     intravenous induction   Anesthetic plan and risks discussed with patient.    Plan discussed with CRNA.

## 2017-06-28 NOTE — ANESTHESIA PROCEDURE NOTES
Airway  Urgency: elective    Airway not difficult    General Information and Staff    Patient location during procedure: OR  CRNA: YNES ABERNATHY    Indications and Patient Condition  Indications for airway management: airway protection    Preoxygenated: yes  MILS not maintained throughout  Mask difficulty assessment: 2 - vent by mask + OA or adjuvant +/- NMBA    Final Airway Details  Final airway type: endotracheal airway      Successful airway: ETT  Cuffed: yes   Successful intubation technique: direct laryngoscopy  Endotracheal tube insertion site: oral  Blade: Kenyon  Blade size: #3  ETT size: 7.5 mm  Cormack-Lehane Classification: grade I - full view of glottis  Placement verified by: chest auscultation and capnometry   Measured from: lips  ETT to lips (cm): 20  Number of attempts at approach: 1    Additional Comments  Negative epigastric sounds, Breath sound equal bilaterally with symmetric chest rise and fall, lips and teeth as pre op

## 2017-06-29 ENCOUNTER — APPOINTMENT (OUTPATIENT)
Dept: GENERAL RADIOLOGY | Facility: HOSPITAL | Age: 52
End: 2017-06-29
Attending: SURGERY

## 2017-06-29 VITALS
SYSTOLIC BLOOD PRESSURE: 135 MMHG | BODY MASS INDEX: 43.5 KG/M2 | TEMPERATURE: 98.1 F | WEIGHT: 287 LBS | HEIGHT: 68 IN | RESPIRATION RATE: 18 BRPM | OXYGEN SATURATION: 100 % | HEART RATE: 61 BPM | DIASTOLIC BLOOD PRESSURE: 78 MMHG

## 2017-06-29 LAB
ALBUMIN SERPL-MCNC: 3.5 G/DL (ref 3.2–4.8)
ALBUMIN/GLOB SERPL: 1.3 G/DL (ref 1.5–2.5)
ALP SERPL-CCNC: 64 U/L (ref 25–100)
ALT SERPL W P-5'-P-CCNC: 34 U/L (ref 7–40)
ANION GAP SERPL CALCULATED.3IONS-SCNC: 9 MMOL/L (ref 3–11)
AST SERPL-CCNC: 23 U/L (ref 0–33)
BASOPHILS # BLD AUTO: 0.02 10*3/MM3 (ref 0–0.2)
BASOPHILS NFR BLD AUTO: 0.1 % (ref 0–1)
BILIRUB SERPL-MCNC: 1.3 MG/DL (ref 0.3–1.2)
BUN BLD-MCNC: 17 MG/DL (ref 9–23)
BUN/CREAT SERPL: 18.9 (ref 7–25)
CALCIUM SPEC-SCNC: 9.4 MG/DL (ref 8.7–10.4)
CHLORIDE SERPL-SCNC: 103 MMOL/L (ref 99–109)
CO2 SERPL-SCNC: 27 MMOL/L (ref 20–31)
CREAT BLD-MCNC: 0.9 MG/DL (ref 0.6–1.3)
DEPRECATED RDW RBC AUTO: 40.7 FL (ref 37–54)
EOSINOPHIL # BLD AUTO: 0.05 10*3/MM3 (ref 0–0.3)
EOSINOPHIL NFR BLD AUTO: 0.3 % (ref 0–3)
ERYTHROCYTE [DISTWIDTH] IN BLOOD BY AUTOMATED COUNT: 12.8 % (ref 11.3–14.5)
GFR SERPL CREATININE-BSD FRML MDRD: 89 ML/MIN/1.73
GLOBULIN UR ELPH-MCNC: 2.6 GM/DL
GLUCOSE BLD-MCNC: 101 MG/DL (ref 70–100)
HCT VFR BLD AUTO: 46.7 % (ref 38.9–50.9)
HGB BLD-MCNC: 15 G/DL (ref 13.1–17.5)
IMM GRANULOCYTES # BLD: 0.04 10*3/MM3 (ref 0–0.03)
IMM GRANULOCYTES NFR BLD: 0.3 % (ref 0–0.6)
IRON 24H UR-MRATE: 84 MCG/DL (ref 50–175)
LYMPHOCYTES # BLD AUTO: 1.92 10*3/MM3 (ref 0.6–4.8)
LYMPHOCYTES NFR BLD AUTO: 13.4 % (ref 24–44)
MCH RBC QN AUTO: 27.9 PG (ref 27–31)
MCHC RBC AUTO-ENTMCNC: 32.1 G/DL (ref 32–36)
MCV RBC AUTO: 87 FL (ref 80–99)
MONOCYTES # BLD AUTO: 1.46 10*3/MM3 (ref 0–1)
MONOCYTES NFR BLD AUTO: 10.2 % (ref 0–12)
NEUTROPHILS # BLD AUTO: 10.87 10*3/MM3 (ref 1.5–8.3)
NEUTROPHILS NFR BLD AUTO: 75.7 % (ref 41–71)
PLATELET # BLD AUTO: 255 10*3/MM3 (ref 150–450)
PMV BLD AUTO: 10.4 FL (ref 6–12)
POTASSIUM BLD-SCNC: 4.4 MMOL/L (ref 3.5–5.5)
PROT SERPL-MCNC: 6.1 G/DL (ref 5.7–8.2)
RBC # BLD AUTO: 5.37 10*6/MM3 (ref 4.2–5.76)
SODIUM BLD-SCNC: 139 MMOL/L (ref 132–146)
WBC NRBC COR # BLD: 14.36 10*3/MM3 (ref 3.5–10.8)

## 2017-06-29 PROCEDURE — 94799 UNLISTED PULMONARY SVC/PX: CPT

## 2017-06-29 PROCEDURE — 80053 COMPREHEN METABOLIC PANEL: CPT | Performed by: SURGERY

## 2017-06-29 PROCEDURE — 25010000002 THIAMINE PER 100 MG: Performed by: SURGERY

## 2017-06-29 PROCEDURE — 94660 CPAP INITIATION&MGMT: CPT

## 2017-06-29 PROCEDURE — 25010000002 PYRIDOXINE PER 100 MG: Performed by: SURGERY

## 2017-06-29 PROCEDURE — 74241: CPT

## 2017-06-29 PROCEDURE — 25010000002 CYANOCOBALAMIN PER 1000 MCG: Performed by: SURGERY

## 2017-06-29 PROCEDURE — 83540 ASSAY OF IRON: CPT | Performed by: SURGERY

## 2017-06-29 PROCEDURE — 25810000003 POTASSIUM CHLORIDE PER 2 MEQ: Performed by: SURGERY

## 2017-06-29 PROCEDURE — 25010000003 CEFAZOLIN IN DEXTROSE 2-4 GM/100ML-% SOLUTION: Performed by: SURGERY

## 2017-06-29 PROCEDURE — 0 DIATRIZOATE MEGLUMINE & SODIUM PER 1 ML: Performed by: SURGERY

## 2017-06-29 PROCEDURE — 85025 COMPLETE CBC W/AUTO DIFF WBC: CPT | Performed by: SURGERY

## 2017-06-29 PROCEDURE — 99024 POSTOP FOLLOW-UP VISIT: CPT | Performed by: SURGERY

## 2017-06-29 PROCEDURE — 25010000002 ENOXAPARIN PER 10 MG: Performed by: SURGERY

## 2017-06-29 RX ORDER — HYDROCODONE BITARTRATE AND ACETAMINOPHEN 7.5; 325 MG/1; MG/1
1 TABLET ORAL EVERY 4 HOURS PRN
Qty: 20 TABLET | Refills: 0 | Status: SHIPPED | OUTPATIENT
Start: 2017-06-29 | End: 2017-07-09

## 2017-06-29 RX ADMIN — CEFAZOLIN SODIUM 2 G: 2 INJECTION, SOLUTION INTRAVENOUS at 00:00

## 2017-06-29 RX ADMIN — LEVOTHYROXINE SODIUM 200 MCG: 200 TABLET ORAL at 09:53

## 2017-06-29 RX ADMIN — DIATRIZOATE MEGLUMINE AND DIATRIZOATE SODIUM 120 ML: 660; 100 LIQUID ORAL; RECTAL at 08:50

## 2017-06-29 RX ADMIN — ENOXAPARIN SODIUM 40 MG: 40 INJECTION SUBCUTANEOUS at 09:57

## 2017-06-29 RX ADMIN — CYANOCOBALAMIN 1000 MCG: 1000 INJECTION, SOLUTION INTRAMUSCULAR; SUBCUTANEOUS at 09:37

## 2017-06-29 RX ADMIN — POTASSIUM CHLORIDE AND SODIUM CHLORIDE 125 ML/HR: 450; 150 INJECTION, SOLUTION INTRAVENOUS at 07:00

## 2017-06-29 RX ADMIN — ACETAMINOPHEN 1000 MG: 10 INJECTION, SOLUTION INTRAVENOUS at 00:01

## 2017-06-29 RX ADMIN — POTASSIUM CHLORIDE AND SODIUM CHLORIDE 125 ML/HR: 450; 150 INJECTION, SOLUTION INTRAVENOUS at 06:06

## 2017-06-29 RX ADMIN — ACETAMINOPHEN 1000 MG: 10 INJECTION, SOLUTION INTRAVENOUS at 06:06

## 2017-06-29 RX ADMIN — THIAMINE HYDROCHLORIDE 250 ML/HR: 100 INJECTION, SOLUTION INTRAMUSCULAR; INTRAVENOUS at 09:37

## 2017-06-29 RX ADMIN — PANTOPRAZOLE SODIUM 40 MG: 40 INJECTION, POWDER, FOR SOLUTION INTRAVENOUS at 06:07

## 2017-07-05 ENCOUNTER — DOCUMENTATION (OUTPATIENT)
Dept: BARIATRICS/WEIGHT MGMT | Facility: CLINIC | Age: 52
End: 2017-07-05

## 2017-07-05 ENCOUNTER — OFFICE VISIT (OUTPATIENT)
Dept: BARIATRICS/WEIGHT MGMT | Facility: CLINIC | Age: 52
End: 2017-07-05

## 2017-07-05 VITALS
TEMPERATURE: 98.2 F | SYSTOLIC BLOOD PRESSURE: 118 MMHG | HEIGHT: 68 IN | WEIGHT: 311 LBS | HEART RATE: 78 BPM | RESPIRATION RATE: 22 BRPM | DIASTOLIC BLOOD PRESSURE: 81 MMHG | BODY MASS INDEX: 47.13 KG/M2 | OXYGEN SATURATION: 99 %

## 2017-07-05 DIAGNOSIS — Z13.21 MALNUTRITION SCREEN: ICD-10-CM

## 2017-07-05 DIAGNOSIS — E66.01 OBESITY, CLASS III, BMI 40-49.9 (MORBID OBESITY) (HCC): ICD-10-CM

## 2017-07-05 DIAGNOSIS — Z98.84 STATUS POST BARIATRIC SURGERY: Primary | ICD-10-CM

## 2017-07-05 PROCEDURE — 99024 POSTOP FOLLOW-UP VISIT: CPT | Performed by: SURGERY

## 2017-07-05 NOTE — PROGRESS NOTES
[facility] Bariatric Surgery  [address]  [facility] [id number] [phone]      Patient Name:  Hao Kim.  :  1965      Date of Visit: 2017      Reason for Visit:  POD # 7  HPI:  Hao Kim is a 52 y.o. male s/p SIPS by  on 17    Doing well.  No major issues/concerns. Small, loose stools. Denies dysphagia, reflux, nausea, vomiting, abdominal pain, pulmonary issues and fevers.  Tolerating diet progression - on stage for post SIPS day 7-10.  Getting 100g prot/day.  Drinking 64+ fluid oz/day.  Taking MVI, B12, B1, Vit D, iron and Other: Vit !, D, E, K,and A.  On ranitidine.  Hasn't even taken any pain medicines.  Holding ASA , NSAIDs , Tramadol, Hormones, Diuretics , Steroids and Immunologics.  Avoiding tobacco.  Ambulating at home around the house and shopping.  Took a bike ride the other night--felt like it was a little too strenuous so stopped.     Presurgery weight: 321 pounds.  Today's weight is (!) 311 lb (141 kg) pounds, today's  Body mass index is 47.29 kg/(m^2)., and his weight loss since surgery is 10 pounds.       Past Medical History:   Diagnosis Date   • Dyspnea on exertion    • Elevated bilirubin     Chronic/ Denies prior GI eval.   • Elevated LFTs    • Fatigue    • Heartburn     UGI neg HH. EGD GDW  mild prox dil, mod narrowing angularis w sl twist, ? sm HH, 38 cm.  Says sx's aren't kalyn, controlled w Zantac. No dysphagia   • Hypothyroidism     recently incr dose synthroid to 200 ug   • Morbid obesity    • Sleep apnea     CPAP compliant   • Wears glasses      Past Surgical History:   Procedure Laterality Date   • GASTRIC SLEEVE LAPAROSCOPIC  2011    s/p lap LSG (34 Ethiopian) w/ Dr Jurado in [place]    • GASTRIC SLEEVE LAPAROSCOPIC N/A 2017    Procedure: Laparoscopic Stomach Intestinal Pylorus-Sparing Surgery (SIPS) ;  Surgeon: Rob Dorantes MD;  Location: [facility];  Service:      Outpatient Prescriptions Marked as  "Taking for the 7/5/17 encounter (Office Visit) with Melinda Polo MD   Medication Sig Dispense Refill   • levothyroxine (SYNTHROID, LEVOTHROID) 200 MCG tablet Take 200 mcg by mouth Daily.     • Multiple Vitamins-Minerals (MULTIVITAMIN ADULT PO) Take 1 tablet by mouth Daily.     • raNITIdine (ZANTAC) 75 MG tablet Take 75 mg by mouth 2 (Two) Times a Day.       No Known Allergies    Social History     Social History   • Marital status:      Spouse name: N/A   • Number of children: N/A   • Years of education: N/A     Occupational History   • Not on file.     Social History Main Topics   • Smoking status: Never Smoker   • Smokeless tobacco: Never Used   • Alcohol use Yes   • Drug use: No   • Sexual activity: Defer      Comment:      Other Topics Concern   • Not on file     Social History Narrative       /81 (BP Location: Right arm, Patient Position: Sitting, Cuff Size: Large Adult)  Pulse 78  Temp 98.2 °F (36.8 °C) (Temporal Artery )   Resp 22  Ht 68\" (172.7 cm)  Wt (!) 311 lb (141 kg)  SpO2 99%  BMI 47.29 kg/m2  Physical Exam   Constitutional: He is oriented to person, place, and time. He appears well-developed and well-nourished.   HENT:   Head: Normocephalic and atraumatic.   Mouth/Throat: No oropharyngeal exudate.   Eyes: EOM are normal. Pupils are equal, round, and reactive to light. No scleral icterus.   Neck: Normal range of motion. Neck supple.   Pulmonary/Chest: Effort normal.   Abdominal: Soft. He exhibits no distension and no mass. There is no tenderness. No hernia.   3 lap incisions and 1 [place] site are well-healing without erythema or drainage.  Mild bruising   Musculoskeletal: Normal range of motion. He exhibits edema.   Neurological: He is alert and oriented to person, place, and time. No cranial nerve deficit.   Skin: Skin is warm and dry. No rash noted. No erythema.   Psychiatric: He has a normal mood and affect. His behavior is normal. Judgment and thought content normal. "         Assessment:   POD #7 SIPS by  on 6/26/17      Plan:  Doing well. Continue to advance diet per manual.  Increase protein intake to 100g/day.  Increase exercise/activity as tolerated.  Reviewed lifting restrictions, nothing >25 lbs x 2 more weeks.  Continue vitamins.  Continue PPI.  Continue to avoid ASA/NSAIDs/Steroids x 6 weeks postop.  Call w/ problems/concerns.    The patient was instructed to follow up in 3 weeks, sooner if needed.     Melinda Polo MD

## 2017-07-31 ENCOUNTER — OFFICE VISIT (OUTPATIENT)
Dept: BARIATRICS/WEIGHT MGMT | Facility: CLINIC | Age: 52
End: 2017-07-31

## 2017-07-31 VITALS
WEIGHT: 306 LBS | OXYGEN SATURATION: 99 % | TEMPERATURE: 98.1 F | HEIGHT: 68 IN | SYSTOLIC BLOOD PRESSURE: 118 MMHG | HEART RATE: 84 BPM | DIASTOLIC BLOOD PRESSURE: 80 MMHG | RESPIRATION RATE: 18 BRPM | BODY MASS INDEX: 46.38 KG/M2

## 2017-07-31 DIAGNOSIS — Z13.21 MALNUTRITION SCREEN: ICD-10-CM

## 2017-07-31 DIAGNOSIS — K21.9 GASTROESOPHAGEAL REFLUX DISEASE, ESOPHAGITIS PRESENCE NOT SPECIFIED: ICD-10-CM

## 2017-07-31 DIAGNOSIS — Z98.84 STATUS POST BARIATRIC SURGERY: Primary | ICD-10-CM

## 2017-07-31 PROCEDURE — 99024 POSTOP FOLLOW-UP VISIT: CPT | Performed by: SURGERY

## 2017-07-31 RX ORDER — OMEPRAZOLE 40 MG/1
40 CAPSULE, DELAYED RELEASE ORAL DAILY
Qty: 30 CAPSULE | Refills: 11 | Status: SHIPPED | OUTPATIENT
Start: 2017-07-31 | End: 2017-08-30

## 2017-07-31 RX ORDER — URSODIOL 300 MG/1
300 CAPSULE ORAL 2 TIMES DAILY
COMMUNITY

## 2017-07-31 NOTE — PROGRESS NOTES
[facility] Bariatric Surgery  [address]  [facility] [id number] [phone]      Patient Name:  Hao Kim.  :  1965      Date of Visit: 2017      Reason for Visit:  1 month postop    HPI:  Hao Kim is a 52 y.o. male s/p SIPS by  on 17    Doing well.  No major issues/concerns. +reflux daily that is new.  Takes ranitidine bid and PRN and also TUMs PRN by the bedside.  Worse at night when he lies down.  Denies dysphagia, nausea, vomiting, abdominal pain, pulmonary issues and fevers.  Tolerating diet progression - on final stage post-SIPS.  Getting 85-100g prot/day.  Drinking 64 fluid oz/day.  Taking MVI, B12, B1, Vit D, iron and Other: vitamin C, calcium, ADEK.  On Actigall  and ranitidine bid..  Still holding ASA , NSAIDs , Tramadol, Hormones, Diuretics , Steroids and Immunologics.  Exercising--riding bikes 2-3 nights per week for 30-45 minutes.       Presurgery weight:  321 pounds. Today's weight is (!) 306 lb (139 kg) pounds, today's Body mass index is 46.53 kg/(m^2)., and his weight loss since surgery is 15 pounds.       Past Medical History:   Diagnosis Date   • Dyspnea on exertion    • Elevated bilirubin     Chronic/ Denies prior GI eval.   • Elevated LFTs    • Fatigue    • Heartburn     UGI neg HH. EGD GDW  mild prox dil, mod narrowing angularis w sl twist, ? sm HH, 38 cm.  Says sx's aren't kalyn, controlled w Zantac. No dysphagia   • Hypothyroidism     recently incr dose synthroid to 200 ug   • Morbid obesity    • Sleep apnea     CPAP compliant   • Wears glasses      Past Surgical History:   Procedure Laterality Date   • GASTRIC SLEEVE LAPAROSCOPIC  2011    s/p lap LSG (34 Tanzanian) w/ Dr Jurado in [place]    • GASTRIC SLEEVE LAPAROSCOPIC N/A 2017    Procedure: Laparoscopic Stomach Intestinal Pylorus-Sparing Surgery (SIPS) ;  Surgeon: Rob Dorantes MD;  Location: [facility];  Service:      Outpatient Prescriptions Marked  "as Taking for the 7/31/17 encounter (Office Visit) with Melinda Polo MD   Medication Sig Dispense Refill   • levothyroxine (SYNTHROID, LEVOTHROID) 200 MCG tablet Take 200 mcg by mouth Daily.     • Multiple Vitamins-Minerals (MULTIVITAMIN ADULT PO) Take 1 tablet by mouth Daily.     • raNITIdine (ZANTAC) 75 MG tablet Take 75 mg by mouth 2 (Two) Times a Day.     • ursodiol (ACTIGALL) 300 MG capsule Take 300 mg by mouth 2 (Two) Times a Day.       No Known Allergies    Social History     Social History   • Marital status:      Spouse name: N/A   • Number of children: N/A   • Years of education: N/A     Occupational History   • Not on file.     Social History Main Topics   • Smoking status: Never Smoker   • Smokeless tobacco: Never Used   • Alcohol use Yes   • Drug use: No   • Sexual activity: Defer      Comment:      Other Topics Concern   • Not on file     Social History Narrative       /80 (BP Location: Left arm, Patient Position: Sitting, Cuff Size: Large Adult)  Pulse 84  Temp 98.1 °F (36.7 °C) (Temporal Artery )   Resp 18  Ht 68\" (172.7 cm)  Wt (!) 306 lb (139 kg)  SpO2 99%  BMI 46.53 kg/m2    Physical Exam   Constitutional: He is oriented to person, place, and time. He appears well-developed and well-nourished.   HENT:   Head: Normocephalic and atraumatic.   Mouth/Throat: No oropharyngeal exudate.   Eyes: EOM are normal. Pupils are equal, round, and reactive to light. No scleral icterus.   Neck: Normal range of motion. Neck supple.   Pulmonary/Chest: Effort normal. No respiratory distress.   Abdominal: Soft. He exhibits no distension and no mass. There is no tenderness. No hernia.   Incisions well-healed, no erythema or fluctuance   Musculoskeletal: Normal range of motion. He exhibits no edema.   Neurological: He is alert and oriented to person, place, and time. No cranial nerve deficit.   Skin: Skin is warm. No rash noted. He is not diaphoretic. No erythema. No pallor. "   Psychiatric: He has a normal mood and affect. His behavior is normal. Judgment and thought content normal.         Assessment:  1 month s/p SIPS by  on 6/26/17    Plan:  Doing well.  Will switch to omeprazole 40 mg per day; will check UGI.  Continue to advance diet per manual.  Continue protein >70g/day.  Encouraged good food choices - high protein, low carb.  Continue routine exercise.  Routine bariatric labs ordered.  Continue vitamins w/ adjustments pending lab results.  Call w/ problems/concerns.    The patient was instructed to follow up in 2 months, sooner if needed.     Melinda Polo MD

## 2017-08-03 ENCOUNTER — HOSPITAL ENCOUNTER (OUTPATIENT)
Dept: GENERAL RADIOLOGY | Facility: HOSPITAL | Age: 52
Discharge: HOME | End: 2017-08-03
Attending: SURGERY | Admitting: SURGERY

## 2017-08-03 DIAGNOSIS — K21.9 GASTROESOPHAGEAL REFLUX DISEASE, ESOPHAGITIS PRESENCE NOT SPECIFIED: ICD-10-CM

## 2017-08-03 PROCEDURE — 74241: CPT

## 2017-08-03 PROCEDURE — 0 DIATRIZOATE MEGLUMINE & SODIUM PER 1 ML: Performed by: SURGERY

## 2017-08-03 RX ADMIN — DIATRIZOATE MEGLUMINE AND DIATRIZOATE SODIUM 180 ML: 660; 100 LIQUID ORAL; RECTAL at 08:15

## 2017-08-07 LAB
25(OH)D3+25(OH)D2 SERPL-MCNC: 26 NG/ML (ref 30–100)
A-TOCOPHEROL VIT E SERPL-MCNC: 9.4 MG/L (ref 5.3–17.5)
ALBUMIN SERPL-MCNC: 3.8 G/DL (ref 3.5–5.5)
ALBUMIN/GLOB SERPL: 1.3 {RATIO} (ref 1.2–2.2)
ALP SERPL-CCNC: 95 IU/L (ref 39–117)
ALT SERPL-CCNC: 47 IU/L (ref 0–44)
AST SERPL-CCNC: 30 IU/L (ref 0–40)
BASOPHILS # BLD AUTO: 0 X10E3/UL (ref 0–0.2)
BASOPHILS NFR BLD AUTO: 0 %
BILIRUB SERPL-MCNC: 1.3 MG/DL (ref 0–1.2)
BUN SERPL-MCNC: 13 MG/DL (ref 6–24)
BUN/CREAT SERPL: 13 (ref 9–20)
CALCIUM SERPL-MCNC: 9.5 MG/DL (ref 8.7–10.2)
CHLORIDE SERPL-SCNC: 105 MMOL/L (ref 96–106)
CO2 SERPL-SCNC: 22 MMOL/L (ref 18–29)
CREAT SERPL-MCNC: 1 MG/DL (ref 0.76–1.27)
EOSINOPHIL # BLD AUTO: 0.2 X10E3/UL (ref 0–0.4)
EOSINOPHIL NFR BLD AUTO: 2 %
ERYTHROCYTE [DISTWIDTH] IN BLOOD BY AUTOMATED COUNT: 13.3 % (ref 12.3–15.4)
FERRITIN SERPL-MCNC: 246 NG/ML (ref 30–400)
FOLATE SERPL-MCNC: 10.7 NG/ML
GLOBULIN SER CALC-MCNC: 2.9 G/DL (ref 1.5–4.5)
GLUCOSE SERPL-MCNC: 97 MG/DL (ref 65–99)
HCT VFR BLD AUTO: 45.8 % (ref 37.5–51)
HGB BLD-MCNC: 15.4 G/DL (ref 12.6–17.7)
IMM GRANULOCYTES # BLD: 0 X10E3/UL (ref 0–0.1)
IMM GRANULOCYTES NFR BLD: 0 %
IRON SERPL-MCNC: 110 UG/DL (ref 38–169)
LYMPHOCYTES # BLD AUTO: 2.3 X10E3/UL (ref 0.7–3.1)
LYMPHOCYTES NFR BLD AUTO: 25 %
MAGNESIUM SERPL-MCNC: 1.9 MG/DL (ref 1.6–2.3)
MCH RBC QN AUTO: 27.8 PG (ref 26.6–33)
MCHC RBC AUTO-ENTMCNC: 33.6 G/DL (ref 31.5–35.7)
MCV RBC AUTO: 83 FL (ref 79–97)
METHYLMALONATE SERPL-SCNC: 131 NMOL/L (ref 0–378)
MONOCYTES # BLD AUTO: 0.7 X10E3/UL (ref 0.1–0.9)
MONOCYTES NFR BLD AUTO: 8 %
NEUTROPHILS # BLD AUTO: 6 X10E3/UL (ref 1.4–7)
NEUTROPHILS NFR BLD AUTO: 65 %
PHOSPHATE SERPL-MCNC: 2.4 MG/DL (ref 2.5–4.5)
PHYTONADIONE SERPL-MCNC: <0.13 NG/ML (ref 0.13–1.88)
PLATELET # BLD AUTO: 207 X10E3/UL (ref 150–379)
POTASSIUM SERPL-SCNC: 4.5 MMOL/L (ref 3.5–5.2)
PREALB SERPL-MCNC: 19 MG/DL (ref 12–34)
PROT SERPL-MCNC: 6.7 G/DL (ref 6–8.5)
PTH-INTACT SERPL-MCNC: 25 PG/ML (ref 15–65)
RBC # BLD AUTO: 5.53 X10E6/UL (ref 4.14–5.8)
SODIUM SERPL-SCNC: 144 MMOL/L (ref 134–144)
TSH SERPL DL<=0.005 MIU/L-ACNC: 4.23 UIU/ML (ref 0.45–4.5)
VIT A SERPL-MCNC: 39 UG/DL (ref 24–85)
VIT B1 BLD-SCNC: 144 NMOL/L (ref 66.5–200)
WBC # BLD AUTO: 9.2 X10E3/UL (ref 3.4–10.8)
ZINC SERPL-MCNC: 55 UG/DL (ref 56–134)

## 2017-08-11 RX ORDER — ERGOCALCIFEROL 1.25 MG/1
50000 CAPSULE ORAL WEEKLY
Qty: 12 CAPSULE | Refills: 0 | Status: SHIPPED | OUTPATIENT
Start: 2017-08-11

## 2017-08-14 DIAGNOSIS — K21.9 GASTROESOPHAGEAL REFLUX DISEASE, ESOPHAGITIS PRESENCE NOT SPECIFIED: Primary | ICD-10-CM

## 2017-08-14 NOTE — H&P
[facility] Bariatric Surgery  [address]  [facility] [id number] [phone]        Patient Name:  Hao Kim.  :  1965        Date of Visit: 17        Reason for Visit:  1 month postop, new reflux     HPI:  Hao Kim is a 52 y.o. male s/p SIPS by  on 17     +reflux daily that is new.  Has taken ranitidine bid and PRN and also TUMs PRN by the bedside until switched to omeprazole by me at last appointment.  Worse at night when he lies down.  Denies dysphagia, nausea, vomiting, abdominal pain, pulmonary issues and fevers.  Tolerating diet progression - on final stage post-SIPS.  Getting 85-100g prot/day.  Drinking 64 fluid oz/day.  Taking MVI, B12, B1, Vit D, iron and Other: vitamin C, calcium, ADEK.  On Actigall bid.    UGI to work up new reflux showed HH.             Medical History         Past Medical History:   Diagnosis Date   • Dyspnea on exertion     • Elevated bilirubin       Chronic/ Denies prior GI eval.   • Elevated LFTs     • Fatigue     • Heartburn       UGI neg HH. EGD GDW  mild prox dil, mod narrowing angularis w sl twist, ? sm HH, 38 cm.  Says sx's aren't kalyn, controlled w Zantac. No dysphagia   • Hypothyroidism       recently incr dose synthroid to 200 ug   • Morbid obesity     • Sleep apnea       CPAP compliant   • Wears glasses            Surgical History          Past Surgical History:   Procedure Laterality Date   • GASTRIC SLEEVE LAPAROSCOPIC   2011     s/p lap LSG (34 [place]cu[place]orean) w/ Dr Jurado in [place]    • GASTRIC SLEEVE LAPAROSCOPIC N/A 2017     Procedure: Laparoscopic Stomach Intestinal Pylorus-Sparing Surgery (SIPS) ;  Surgeon: Rob Dorantes MD;  [facility];  Service:           Active Medications           Outpatient Prescriptions Marked as Taking for the 17 encounter (Office Visit) with Melinda Polo MD   Medication Sig Dispense Refill   • levothyroxine (SYNTHROID, LEVOTHROID)  "200 MCG tablet Take 200 mcg by mouth Daily.       • Multiple Vitamins-Minerals (MULTIVITAMIN ADULT PO) Take 1 tablet by mouth Daily.       • raNITIdine (ZANTAC) 75 MG tablet Take 75 mg by mouth 2 (Two) Times a Day.       • ursodiol (ACTIGALL) 300 MG capsule Take 300 mg by mouth 2 (Two) Times a Day.          Omeprazole 40 mg daily         No Known Allergies      Social History    Social History            Social History   • Marital status:        Spouse name: N/A   • Number of children: N/A   • Years of education: N/A          Occupational History   • Not on file.      Social History Main Topics    • Smoking status: Never Smoker    • Smokeless tobacco: Never Used    • Alcohol use Yes   • Drug use: No    • Sexual activity: Defer          Comment:             Other Topics Concern   • Not on file      Social History Narrative            /80 (BP Location: Left arm, Patient Position: Sitting, Cuff Size: Large Adult)  Pulse 84  Temp 98.1 °F (36.7 °C) (Temporal Artery )   Resp 18  Ht 68\" (172.7 cm)  Wt (!) 306 lb (139 kg)  SpO2 99%  BMI 46.53 kg/m2     Physical Exam   Constitutional: He is oriented to person, place, and time. He appears well-developed and well-nourished.   HENT:   Head: Normocephalic and atraumatic.   Mouth/Throat: No oropharyngeal exudate.   Eyes: EOM are normal. Pupils are equal, round, and reactive to light. No scleral icterus.   Neck: Normal range of motion. Neck supple.   Pulmonary/Chest: Effort normal. No respiratory distress.   Abdominal: Soft. He exhibits no distension and no mass. There is no tenderness. No hernia.   Incisions well-healed, no erythema or fluctuance   Musculoskeletal: Normal range of motion. He exhibits no edema.   Neurological: He is alert and oriented to person, place, and time. No cranial nerve deficit.   Skin: Skin is warm. No rash noted. He is not diaphoretic. No erythema. No pallor.   Psychiatric: He has a normal mood and affect. His behavior is " normal. Judgment and thought content normal.            Assessment:  1 month s/p SIPS by  on 6/26/17, new reflux symptoms.     Plan:  omeprazole 40 mg daily.  UGI showed HH.  Plan EGD with Dr. Dorantes.     Melinda Polo MD

## 2017-08-27 ENCOUNTER — APPOINTMENT (OUTPATIENT)
Dept: PREADMISSION TESTING | Facility: HOSPITAL | Age: 52
End: 2017-08-27

## 2017-08-27 LAB — HCT VFR BLD AUTO: 46.9 % (ref 38.9–50.9)

## 2017-08-27 PROCEDURE — 36415 COLL VENOUS BLD VENIPUNCTURE: CPT

## 2017-08-27 PROCEDURE — 85014 HEMATOCRIT: CPT | Performed by: ANESTHESIOLOGY

## 2017-08-28 ENCOUNTER — OUTSIDE FACILITY SERVICE (OUTPATIENT)
Dept: BARIATRICS/WEIGHT MGMT | Facility: CLINIC | Age: 52
End: 2017-08-28

## 2017-08-28 ENCOUNTER — LAB REQUISITION (OUTPATIENT)
Dept: LAB | Facility: HOSPITAL | Age: 52
End: 2017-08-28

## 2017-08-28 DIAGNOSIS — K21.9 GASTRO-ESOPHAGEAL REFLUX DISEASE WITHOUT ESOPHAGITIS: ICD-10-CM

## 2017-08-28 PROCEDURE — 43239 EGD BIOPSY SINGLE/MULTIPLE: CPT | Performed by: SURGERY

## 2017-08-28 PROCEDURE — 88305 TISSUE EXAM BY PATHOLOGIST: CPT | Performed by: SURGERY

## 2017-08-29 LAB
CYTO UR: NORMAL
LAB AP CASE REPORT: NORMAL
LAB AP CLINICAL INFORMATION: NORMAL
Lab: NORMAL
PATH REPORT.FINAL DX SPEC: NORMAL
PATH REPORT.GROSS SPEC: NORMAL

## 2018-01-22 ENCOUNTER — TELEPHONE (OUTPATIENT)
Dept: BARIATRICS/WEIGHT MGMT | Facility: CLINIC | Age: 53
End: 2018-01-22

## 2018-01-22 RX ORDER — OMEPRAZOLE 40 MG/1
40 CAPSULE, DELAYED RELEASE ORAL DAILY
Qty: 90 CAPSULE | Refills: 3 | Status: SHIPPED | OUTPATIENT
Start: 2018-01-22 | End: 2018-01-26 | Stop reason: SDUPTHER

## 2018-01-26 RX ORDER — OMEPRAZOLE 40 MG/1
40 CAPSULE, DELAYED RELEASE ORAL DAILY
Qty: 90 CAPSULE | Refills: 3 | Status: SHIPPED | OUTPATIENT
Start: 2018-01-26

## (undated) DEVICE — ENDOPATH XCEL BLADELESS TROCARS WITH STABILITY SLEEVES: Brand: ENDOPATH XCEL

## (undated) DEVICE — SUT SILK 2/0 SH 30IN K833H

## (undated) DEVICE — CONTN GRAD MEAS TRIANG 32OZ BLK

## (undated) DEVICE — SUT MONOCRYL PLS ANTIB UND 3/0  PS1 27IN

## (undated) DEVICE — MEDI-VAC NON-CONDUCTIVE SUCTION TUBING: Brand: CARDINAL HEALTH

## (undated) DEVICE — JP PERF DRN SIL FLT 10MM FULL: Brand: CARDINAL HEALTH

## (undated) DEVICE — ENDOPATH XCEL UNIVERSAL TROCAR STABLILITY SLEEVES: Brand: ENDOPATH XCEL

## (undated) DEVICE — DRN PENRS 1/2X18IN LTX

## (undated) DEVICE — AIRWY SZ11

## (undated) DEVICE — SKIN AFFIX SURG ADHESIVE 72/CS 0.55ML: Brand: MEDLINE

## (undated) DEVICE — MEDI-VAC YANKAUER SUCTION HANDLE W/BULBOUS TIP: Brand: CARDINAL HEALTH

## (undated) DEVICE — TROCAR: Brand: KII FIOS FIRST ENTRY

## (undated) DEVICE — ECHELON FLEX POWERED PLUS LONG ARTICULATING ENDOSCOPIC LINEAR CUTTER, 60MM: Brand: ECHELON FLEX

## (undated) DEVICE — GLV SURG SIGNATURE TOUCH PF LTX 8.5 STRL

## (undated) DEVICE — [HIGH FLOW INSUFFLATOR,  DO NOT USE IF PACKAGE IS DAMAGED,  KEEP DRY,  KEEP AWAY FROM SUNLIGHT,  PROTECT FROM HEAT AND RADIOACTIVE SOURCES.]: Brand: PNEUMOSURE

## (undated) DEVICE — SUT SILK 0 SH 30IN K834H

## (undated) DEVICE — SUT ETHLN 2/0 PS 18IN 585H

## (undated) DEVICE — ST NERV BLCK CONT CONTIPLEX ECHO CLSD 18G 4IN

## (undated) DEVICE — Device

## (undated) DEVICE — HARMONIC ACE +7 LAPAROSCOPIC SHEARS ADVANCED HEMOSTASIS 5MM DIAMETER 45CM SHAFT LENGTH  FOR USE WITH GRAY HAND PIECE ONLY: Brand: HARMONIC ACE

## (undated) DEVICE — APL DUPLOSPRAYER MIS 40CM

## (undated) DEVICE — ENCORE® LATEX MICRO SIZE 8, STERILE LATEX POWDER-FREE SURGICAL GLOVE: Brand: ENCORE

## (undated) DEVICE — 50" SINGLE PATIENT USE HOVERMATT: Brand: SINGLE PATIENT USE HOVERMATT

## (undated) DEVICE — PK BARIATRIC 10

## (undated) DEVICE — ANTIBACTERIAL UNDYED BRAIDED (POLYGLACTIN 910), SYNTHETIC ABSORBABLE SUTURE: Brand: COATED VICRYL

## (undated) DEVICE — CANNULA,ADULT,SOFT-TOUCH,7TUBE,SC: Brand: MEDLINE

## (undated) DEVICE — FLTR PLUMEPORT LAP W/CONN STRL